# Patient Record
Sex: FEMALE | Race: WHITE | Employment: FULL TIME | URBAN - METROPOLITAN AREA
[De-identification: names, ages, dates, MRNs, and addresses within clinical notes are randomized per-mention and may not be internally consistent; named-entity substitution may affect disease eponyms.]

---

## 2017-03-20 ENCOUNTER — TRANSFERRED RECORDS (OUTPATIENT)
Dept: HEALTH INFORMATION MANAGEMENT | Facility: CLINIC | Age: 37
End: 2017-03-20

## 2017-04-03 ENCOUNTER — TRANSFERRED RECORDS (OUTPATIENT)
Dept: HEALTH INFORMATION MANAGEMENT | Facility: CLINIC | Age: 37
End: 2017-04-03

## 2018-09-13 ENCOUNTER — TRANSFERRED RECORDS (OUTPATIENT)
Dept: HEALTH INFORMATION MANAGEMENT | Facility: CLINIC | Age: 38
End: 2018-09-13

## 2018-10-30 ENCOUNTER — TRANSFERRED RECORDS (OUTPATIENT)
Dept: HEALTH INFORMATION MANAGEMENT | Facility: CLINIC | Age: 38
End: 2018-10-30

## 2019-05-01 ENCOUNTER — TRANSFERRED RECORDS (OUTPATIENT)
Dept: HEALTH INFORMATION MANAGEMENT | Facility: CLINIC | Age: 39
End: 2019-05-01

## 2019-11-11 ENCOUNTER — TRANSFERRED RECORDS (OUTPATIENT)
Dept: HEALTH INFORMATION MANAGEMENT | Facility: CLINIC | Age: 39
End: 2019-11-11

## 2019-11-12 ENCOUNTER — TRANSFERRED RECORDS (OUTPATIENT)
Dept: HEALTH INFORMATION MANAGEMENT | Facility: CLINIC | Age: 39
End: 2019-11-12

## 2019-11-15 NOTE — TELEPHONE ENCOUNTER
ONCOLOGY INTAKE: Records Information      APPT INFORMATION:  Referring provider:  Dr. Mayito Mcgraw  Referring provider s clinic:  [facility]  Reason for visit/diagnosis:  Complex Ovarian Cyst  Has patient been notified of appointment date and time?: yes    RECORDS INFORMATION:  Were the records received with the referral (via [employer])? Not yet, patient states they are on their way    Has patient been seen for any external appt for this diagnosis? yes    If yes, where? [facility]    Has patient had any imaging or procedures outside of [place] for this condition? yes      If Yes, where? [facility]    ADDITIONAL INFORMATION:  Will fax records and referral once received

## 2019-11-18 NOTE — TELEPHONE ENCOUNTER
RECORDS STATUS - ALL OTHER DIAGNOSIS      RECORDS RECEIVED FROM: [facility]   DATE RECEIVED: 11/19/2019   NOTES STATUS DETAILS   OFFICE NOTE from referring provider Complete  EPIC   OFFICE NOTE from medical oncologist     DISCHARGE SUMMARY from hospital     DISCHARGE REPORT from the ER     OPERATIVE REPORT     MEDICATION LIST     CLINICAL TRIAL TREATMENTS TO DATE     LABS     PATHOLOGY REPORTS     ANYTHING RELATED TO DIAGNOSIS     GENONOMIC TESTING     TYPE:     IMAGING (NEED IMAGES & REPORT)     CT SCANS Complete  [facility] IMG in PACS   MRI     MAMMO     ULTRASOUND Complete  [facility] IMG in PACS   PET       Action    Action Taken 11/18/2019 9:34am     I called pt Jessika - She mentioned that her records are at [facility] (Including her CA- 125 Test). Dr. Mayito Mcgraw is her primary.  She also mentioned having a CT and Pelvic US done. I called [facility] and was recommend to call [facility] (Ph: [phone])  to get her IMG and reports sent over. I also left a vm for the medical records dept at [facility].     11:33am   I resolved IMG ([facility]) in PACS.     12pm Records ([facility])  faxed to HIM     1:59pm   I sent an email to HIM (Kendra) to make sure the outside records are uploaded into the pt's chart today.

## 2019-11-19 ENCOUNTER — ONCOLOGY VISIT (OUTPATIENT)
Dept: ONCOLOGY | Facility: CLINIC | Age: 39
End: 2019-11-19
Attending: OBSTETRICS & GYNECOLOGY
Payer: COMMERCIAL

## 2019-11-19 ENCOUNTER — DOCUMENTATION ONLY (OUTPATIENT)
Dept: ONCOLOGY | Facility: CLINIC | Age: 39
End: 2019-11-19

## 2019-11-19 ENCOUNTER — HOSPITAL ENCOUNTER (OUTPATIENT)
Facility: CLINIC | Age: 39
Setting detail: SPECIMEN
Discharge: HOME | End: 2019-11-19
Attending: OBSTETRICS & GYNECOLOGY | Admitting: OBSTETRICS & GYNECOLOGY
Payer: COMMERCIAL

## 2019-11-19 ENCOUNTER — PRE VISIT (OUTPATIENT)
Dept: ONCOLOGY | Facility: CLINIC | Age: 39
End: 2019-11-19

## 2019-11-19 VITALS
WEIGHT: 208 LBS | OXYGEN SATURATION: 97 % | HEIGHT: 63 IN | RESPIRATION RATE: 16 BRPM | TEMPERATURE: 99.1 F | SYSTOLIC BLOOD PRESSURE: 159 MMHG | DIASTOLIC BLOOD PRESSURE: 100 MMHG | BODY MASS INDEX: 36.86 KG/M2 | HEART RATE: 118 BPM

## 2019-11-19 DIAGNOSIS — R19.00 PELVIC MASS: Primary | ICD-10-CM

## 2019-11-19 LAB
ABO + RH BLD: NORMAL
ABO + RH BLD: NORMAL
ALBUMIN SERPL-MCNC: 3.8 G/DL (ref 3.4–5)
ALP SERPL-CCNC: 64 U/L (ref 40–150)
ALT SERPL W P-5'-P-CCNC: 19 U/L (ref 0–50)
ANION GAP SERPL CALCULATED.3IONS-SCNC: 5 MMOL/L (ref 3–14)
AST SERPL W P-5'-P-CCNC: 20 U/L (ref 0–45)
BASOPHILS # BLD AUTO: 0.1 10E9/L (ref 0–0.2)
BASOPHILS NFR BLD AUTO: 0.6 %
BILIRUB SERPL-MCNC: 0.4 MG/DL (ref 0.2–1.3)
BLD GP AB SCN SERPL QL: NORMAL
BLOOD BANK CMNT PATIENT-IMP: NORMAL
BUN SERPL-MCNC: 6 MG/DL (ref 7–30)
CALCIUM SERPL-MCNC: 9.1 MG/DL (ref 8.5–10.1)
CHLORIDE SERPL-SCNC: 110 MMOL/L (ref 94–109)
CO2 SERPL-SCNC: 24 MMOL/L (ref 20–32)
CREAT SERPL-MCNC: 0.72 MG/DL (ref 0.52–1.04)
DIFFERENTIAL METHOD BLD: NORMAL
EOSINOPHIL # BLD AUTO: 0 10E9/L (ref 0–0.7)
EOSINOPHIL NFR BLD AUTO: 0.5 %
ERYTHROCYTE [DISTWIDTH] IN BLOOD BY AUTOMATED COUNT: 11.9 % (ref 10–15)
GFR SERPL CREATININE-BSD FRML MDRD: >90 ML/MIN/{1.73_M2}
GLUCOSE SERPL-MCNC: 105 MG/DL (ref 70–99)
HCT VFR BLD AUTO: 41.8 % (ref 35–47)
HGB BLD-MCNC: 13.7 G/DL (ref 11.7–15.7)
IMM GRANULOCYTES # BLD: 0 10E9/L (ref 0–0.4)
IMM GRANULOCYTES NFR BLD: 0.4 %
LYMPHOCYTES # BLD AUTO: 1.4 10E9/L (ref 0.8–5.3)
LYMPHOCYTES NFR BLD AUTO: 17.6 %
MCH RBC QN AUTO: 29.9 PG (ref 26.5–33)
MCHC RBC AUTO-ENTMCNC: 32.8 G/DL (ref 31.5–36.5)
MCV RBC AUTO: 91 FL (ref 78–100)
MONOCYTES # BLD AUTO: 0.4 10E9/L (ref 0–1.3)
MONOCYTES NFR BLD AUTO: 5.2 %
NEUTROPHILS # BLD AUTO: 6.1 10E9/L (ref 1.6–8.3)
NEUTROPHILS NFR BLD AUTO: 75.7 %
NRBC # BLD AUTO: 0 10*3/UL
NRBC BLD AUTO-RTO: 0 /100
PLATELET # BLD AUTO: 300 10E9/L (ref 150–450)
POTASSIUM SERPL-SCNC: 3.9 MMOL/L (ref 3.4–5.3)
PROT SERPL-MCNC: 7.4 G/DL (ref 6.8–8.8)
RBC # BLD AUTO: 4.58 10E12/L (ref 3.8–5.2)
SODIUM SERPL-SCNC: 139 MMOL/L (ref 133–144)
SPECIMEN EXP DATE BLD: NORMAL
WBC # BLD AUTO: 8.1 10E9/L (ref 4–11)

## 2019-11-19 PROCEDURE — 36415 COLL VENOUS BLD VENIPUNCTURE: CPT

## 2019-11-19 PROCEDURE — G0463 HOSPITAL OUTPT CLINIC VISIT: HCPCS

## 2019-11-19 PROCEDURE — 99205 OFFICE O/P NEW HI 60 MIN: CPT | Performed by: OBSTETRICS & GYNECOLOGY

## 2019-11-19 PROCEDURE — 85025 COMPLETE CBC W/AUTO DIFF WBC: CPT | Performed by: OBSTETRICS & GYNECOLOGY

## 2019-11-19 PROCEDURE — 80053 COMPREHEN METABOLIC PANEL: CPT | Performed by: OBSTETRICS & GYNECOLOGY

## 2019-11-19 PROCEDURE — 86850 RBC ANTIBODY SCREEN: CPT | Performed by: OBSTETRICS & GYNECOLOGY

## 2019-11-19 PROCEDURE — 86901 BLOOD TYPING SEROLOGIC RH(D): CPT | Performed by: OBSTETRICS & GYNECOLOGY

## 2019-11-19 PROCEDURE — 86900 BLOOD TYPING SEROLOGIC ABO: CPT | Performed by: OBSTETRICS & GYNECOLOGY

## 2019-11-19 RX ORDER — LORAZEPAM 0.5 MG/1
0.5 TABLET ORAL EVERY 6 HOURS PRN
COMMUNITY

## 2019-11-19 RX ORDER — PHENAZOPYRIDINE HYDROCHLORIDE 100 MG/1
200 TABLET, FILM COATED ORAL ONCE
Status: CANCELLED | OUTPATIENT
Start: 2019-11-19 | End: 2019-11-19

## 2019-11-19 RX ORDER — HEPARIN SODIUM 10000 [USP'U]/ML
5000 INJECTION, SOLUTION INTRAVENOUS; SUBCUTANEOUS
Status: CANCELLED | OUTPATIENT
Start: 2019-11-19

## 2019-11-19 ASSESSMENT — PAIN SCALES - GENERAL: PAINLEVEL: NO PAIN (0)

## 2019-11-19 ASSESSMENT — MIFFLIN-ST. JEOR: SCORE: 1586.22

## 2019-11-19 NOTE — NURSING NOTE
"Oncology Rooming Note    November 19, 2019 9:59 AM   Jessika Marks is a 39 year old female who presents for:    Chief Complaint   Patient presents with     Oncology Clinic Visit     New Patient consult     Initial Vitals: BP (!) 159/100   Pulse 118   Temp 99.1  F (37.3  C) (Tympanic)   Resp 16   Ht 1.59 m (5' 2.6\")   Wt 94.3 kg (208 lb)   SpO2 97%   BMI 37.32 kg/m   Estimated body mass index is 37.32 kg/m  as calculated from the following:    Height as of this encounter: 1.59 m (5' 2.6\").    Weight as of this encounter: 94.3 kg (208 lb). Body surface area is 2.04 meters squared.  No Pain (0) Comment: Data Unavailable   No LMP recorded.  Allergies reviewed: Yes  Medications reviewed: Yes    Medications: Medication refills not needed today.  Pharmacy name entered into EPIC: Data Unavailable    Clinical concerns: New Patient       Fatou Davis, ZANE              "

## 2019-11-19 NOTE — PROGRESS NOTES
I scheduled surgery for this patient with Dr. Pereyra on 11/27 at [place]. Scheduled per MD orders.    The RN has completed the education regarding the surgery, and they were provided a surgery packet with instructions and a map.     They are aware that they will receive a call  ~2 days prior to the scheduled procedure and will be given an exact arrival/start time.    Per communication - I did not need to call the patient to provide any extra additional information. I will follow up if anything changes.

## 2019-11-19 NOTE — PATIENT INSTRUCTIONS
You have been scheduled for surgery on: 11/27    Diagnosis:  Pelvic mass    The surgical procedure is: Laparoscopic removal of one ovary, possible cancer surgery, possible open    Anticipated length of surgery: 1-2 hrs.  You will be in the recovery room for approximately 2-3 hrs after surgery.  Your family will not be able to see you until after you leave the recovery room.    Length of hospital stay:  This is an outpatient, extended stay surgery.  You will be discharged same day if you meet criteria for discharge.  If you have a larger surgical incision, you will need to be in the hospital 2-3 days.  ______________________________________________________________________    Preparation for Surgery:    To Schedule   1.  Labs:  CBC, CMP, T/S, orders placed, please perform today   2.  Post-operative exam with me 1-2 weeks following surgery      Postoperative Restrictions:  No heavy lifting >20lbs for six weeks.     Postoperative visit:  Return to clinic 1-2 weeks after surgery for post operative visit.      Please contact the clinic with any questions or concerns.    Chanel Pereyra MD  Gynecologic Oncology  [facility]

## 2019-11-19 NOTE — PROGRESS NOTES
Consult Notes on Referred Patient              RE: Jessika Marks  : 1980  MARTIN: 2019        I had the pleasure of seeing your patient Jessika Marks here at the Gynecologic Cancer Clinic at the [facility] on 2019.  As you know she is a very pleasant 39 year old woman with a recent diagnosis of pelvic mass and elevated .  Given these findings she was subsequently sent to the Gynecologic Cancer Clinic for new patient consultation.  She is accompanied today by her mother and good friend.    Patient developed fever and RLQ pain associated with diarrhea and presented to the ED.  She underwent a CT as there was concern for appendicitis.  Her appendix was noted to be normal but she was found to have a left adnexal mass.  Her fever and diarrhea have since resolved and her son had a similar gastroenteritis like illness a few days following this episode.  She is now asymptomatic.  Of note she had a hysterectomy, BS and was also found to have endometriosis that was fulgurated at the time of that procedure.    19:  CT A/P:  Multiloculated cystic mass in the left adnexa, likely arising from the left ovary measuring 8.7 cm.  Mild inflammatory changes around this mass.  No areas of metastatic disease identified.    19:  US Pelvis:  Complex cystic mass in the left ovary measuring 9.3 cm with a thickened wall and thick septations    19:  OVA1 = 9.6 = high risk    = 338      Review of Systems:  Systemic           no weight changes; no fever; no chills; no night sweats; no appetite changes  Skin           no rashes, or lesions  Eye           no irritation; no changes in vision  Jean-Laryngeal           no dysphagia; no hoarseness   Pulmonary    no cough; no shortness of breath  Cardiovascular    no chest pain; no palpitations  Gastrointestinal    no diarrhea; no constipation; no abdominal pain; no changes in bowel  habits; no blood in stool  Genitourinary   no urinary  frequency; no urinary urgency; no dysuria; no pain; no abnormal vaginal discharge; no abnormal vaginal bleeding  Breast    no breast discharge; no breast changes; no breast pain  Musculoskeletal    no myalgias; no arthralgias; no back pain  Psychiatric           no depressed mood; no anxiety    Hematologic            no tender lymph nodes; no noticeable swellings or lumps   Endocrine    no hot flashes; no heat/cold intolerance         Neurological   no tremor; no numbness and tingling; no headaches; no difficulty sleeping    Obstetrics and Gynecology History:  ,  x 1      Past Medical History:  History reviewed. No pertinent past medical history.    Past Surgical History:  Past Surgical History:   Procedure Laterality Date     C VAGINAL HYSTERECTOMY       CARPAL TUNNEL RELEASE RT/LT Right      LAPAROSCOPIC CHOLECYSTECTOMY         Health Maintenance:  Last Pap Smear: Prior to hysterectomy-no need for further pap smear exams  She has not had a history of abnormal Pap smears.    Last Mammogram: N/A    Last Colonoscopy: N/A      Current Medications:   has a current medication list which includes the following prescription(s): lorazepam.     Allergies:   Patient has no known allergies.      Social History:  Social History     Socioeconomic History     Marital status:      Spouse name: Not on file     Number of children: Not on file     Years of education: Not on file     Highest education level: Not on file   Occupational History     Not on file   Social Needs     Financial resource strain: Not on file     Food insecurity:     Worry: Not on file     Inability: Not on file     Transportation needs:     Medical: Not on file     Non-medical: Not on file   Tobacco Use     Smoking status: Current Some Day Smoker     Smokeless tobacco: Never Used   Substance and Sexual Activity     Alcohol use: Yes     Drug use: Never     Sexual activity: Yes   Lifestyle     Physical activity:     Days per week: Not on file  "    Minutes per session: Not on file     Stress: Not on file   Relationships     Social connections:     Talks on phone: Not on file     Gets together: Not on file     Attends Rastafarian service: Not on file     Active member of club or organization: Not on file     Attends meetings of clubs or organizations: Not on file     Relationship status: Not on file     Intimate partner violence:     Fear of current or ex partner: Not on file     Emotionally abused: Not on file     Physically abused: Not on file     Forced sexual activity: Not on file   Other Topics Concern     Not on file   Social History Narrative     Not on file       Lives with , feels safe at home.  Works as a .  Enjoys riding horses, reading, watching son play hockey.  Does not have an advanced directive on file and would like her  to be her POA.  Full code.    Family History:   The patient's family history is significant for.  Family History   Problem Relation Age of Onset     Lymphoma Father      Breast Cancer Paternal Grandmother          Physical Exam:   BP (!) 159/100   Pulse 118   Temp 99.1  F (37.3  C) (Tympanic)   Resp 16   Ht 1.59 m (5' 2.6\")   Wt 94.3 kg (208 lb)   SpO2 97%   BMI 37.32 kg/m    Body mass index is 37.32 kg/m .    General Appearance: healthy and alert, no distress     HEENT:  no thyromegaly, no palpable nodules or masses        Cardiovascular: regular rate and rhythm, no gallops, rubs or murmurs     Respiratory: lungs clear, no rales, rhonchi or wheezes, normal diaphragmatic excursion    Musculoskeletal: extremities non tender and without edema    Skin: no lesions or rashes     Neurological: normal gait, no gross defects     Psychiatric: appropriate mood and affect                               Hematological: normal cervical, supraclavicular and inguinal lymph nodes     Gastrointestinal:       abdomen soft, non-tender, non-distended, no organomegaly or masses    Genitourinary: External genitalia " and urethral meatus appears normal.  Vagina is smooth without nodularity or masses.  Cervix surgically absent.  Bimanual exam reveal no masses, nodularity or fullness.  Recto-vaginal exam confirms these findings.      Assessment:    Jessika Marks is a 39 year old woman with a new diagnosis of pelvic mass, elevated .     A total of 60 minutes was spent with the patient, >50% of which were spent in counseling the patient and/or treatment planning.      Plan:     1.)    Pelvic mass, elevated .  We discussed possible etiologies including benign, malignant and borderline.  We reviewed her CT without evidence of metastatic disease and discussed that this is reassuring.  We discussed her elevated  and that this does make malignancy more likely this is also possibly due to her recent gastroenteritis or endometriosis.  We discussed the rationale for not performing a biopsy of the ovary.  We discussed options including repeat imaging vs immediate surgical resection and she would like to proceed with immediate surgical resection.  She asked about bilateral oophorectomy, however I have advised that if this is not malignant that she retain her other ovary even in the setting of borderline tumor unless her contralateral ovary is also grossly abnormal.  We reviewed the role of frozen section analysis and discussed that further surgical decisions would be based on these findings.  Following a thorough discussion of the risks, benefits, indications and alternatives she signed consents for Laparoscopic removal of one ovary, possible cancer surgery, possible open on 11/27.     2.) Genetic risk factors were assessed and the patient does not meet the qualifications for a referral unless malignancy is identified.      3.) Labs and/or tests ordered include:  CBC, CMP, T/S.     4.) Health maintenance issues addressed today include pt is up to date.    5.) Pre-op teaching was completed today.        Thank you for allowing  us to participate in the care of your patient.         Sincerely,    Chanel Pereyra MD  Gynecologic Oncology  [facility]       CC

## 2019-11-19 NOTE — LETTER
2019         RE: Jessika Marks  [address] [place] [address]        Dear Colleague,    Thank you for referring your patient, Jessika Marks, to the [facility]. Please see a copy of my visit note below.    Consult Notes on Referred Patient              RE: Jessika Marks  : 1980  MARTIN: 2019        I had the pleasure of seeing your patient Jessika Marks here at the Gynecologic Cancer Clinic at the [facility] on 2019.  As you know she is a very pleasant 39 year old woman with a recent diagnosis of pelvic mass and elevated .  Given these findings she was subsequently sent to the Gynecologic Cancer Clinic for new patient consultation.  She is accompanied today by her mother and good friend.    Patient developed fever and RLQ pain associated with diarrhea and presented to the ED.  She underwent a CT as there was concern for appendicitis.  Her appendix was noted to be normal but she was found to have a left adnexal mass.  Her fever and diarrhea have since resolved and her son had a similar gastroenteritis like illness a few days following this episode.  She is now asymptomatic.  Of note she had a hysterectomy, BS and was also found to have endometriosis that was fulgurated at the time of that procedure.    19:  CT A/P:  Multiloculated cystic mass in the left adnexa, likely arising from the left ovary measuring 8.7 cm.  Mild inflammatory changes around this mass.  No areas of metastatic disease identified.    19:  US Pelvis:  Complex cystic mass in the left ovary measuring 9.3 cm with a thickened wall and thick septations    19:  OVA1 = 9.6 = high risk    = 338      Review of Systems:  Systemic           no weight changes; no fever; no chills; no night sweats; no appetite changes  Skin           no rashes, or lesions  Eye           no irritation; no changes in vision  Jean-Laryngeal           no dysphagia; no hoarseness   Pulmonary     no cough; no shortness of breath  Cardiovascular    no chest pain; no palpitations  Gastrointestinal    no diarrhea; no constipation; no abdominal pain; no changes in bowel  habits; no blood in stool  Genitourinary   no urinary frequency; no urinary urgency; no dysuria; no pain; no abnormal vaginal discharge; no abnormal vaginal bleeding  Breast    no breast discharge; no breast changes; no breast pain  Musculoskeletal    no myalgias; no arthralgias; no back pain  Psychiatric           no depressed mood; no anxiety    Hematologic            no tender lymph nodes; no noticeable swellings or lumps   Endocrine    no hot flashes; no heat/cold intolerance         Neurological   no tremor; no numbness and tingling; no headaches; no difficulty sleeping    Obstetrics and Gynecology History:  ,  x 1      Past Medical History:  History reviewed. No pertinent past medical history.    Past Surgical History:  Past Surgical History:   Procedure Laterality Date     C VAGINAL HYSTERECTOMY       CARPAL TUNNEL RELEASE RT/LT Right      LAPAROSCOPIC CHOLECYSTECTOMY         Health Maintenance:  Last Pap Smear: Prior to hysterectomy-no need for further pap smear exams  She has not had a history of abnormal Pap smears.    Last Mammogram: N/A    Last Colonoscopy: N/A      Current Medications:   has a current medication list which includes the following prescription(s): lorazepam.     Allergies:   Patient has no known allergies.      Social History:  Social History     Socioeconomic History     Marital status:      Spouse name: Not on file     Number of children: Not on file     Years of education: Not on file     Highest education level: Not on file   Occupational History     Not on file   Social Needs     Financial resource strain: Not on file     Food insecurity:     Worry: Not on file     Inability: Not on file     Transportation needs:     Medical: Not on file     Non-medical: Not on file   Tobacco Use     Smoking  "status: Current Some Day Smoker     Smokeless tobacco: Never Used   Substance and Sexual Activity     Alcohol use: Yes     Drug use: Never     Sexual activity: Yes   Lifestyle     Physical activity:     Days per week: Not on file     Minutes per session: Not on file     Stress: Not on file   Relationships     Social connections:     Talks on phone: Not on file     Gets together: Not on file     Attends Mosque service: Not on file     Active member of club or organization: Not on file     Attends meetings of clubs or organizations: Not on file     Relationship status: Not on file     Intimate partner violence:     Fear of current or ex partner: Not on file     Emotionally abused: Not on file     Physically abused: Not on file     Forced sexual activity: Not on file   Other Topics Concern     Not on file   Social History Narrative     Not on file       Lives with , feels safe at home.  Works as a .  Enjoys riding horses, reading, watching son play hockey.  Does not have an advanced directive on file and would like her  to be her POA.  Full code.    Family History:   The patient's family history is significant for.  Family History   Problem Relation Age of Onset     Lymphoma Father      Breast Cancer Paternal Grandmother          Physical Exam:   BP (!) 159/100   Pulse 118   Temp 99.1  F (37.3  C) (Tympanic)   Resp 16   Ht 1.59 m (5' 2.6\")   Wt 94.3 kg (208 lb)   SpO2 97%   BMI 37.32 kg/m     Body mass index is 37.32 kg/m .    General Appearance: healthy and alert, no distress     HEENT:  no thyromegaly, no palpable nodules or masses        Cardiovascular: regular rate and rhythm, no gallops, rubs or murmurs     Respiratory: lungs clear, no rales, rhonchi or wheezes, normal diaphragmatic excursion    Musculoskeletal: extremities non tender and without edema    Skin: no lesions or rashes     Neurological: normal gait, no gross defects     Psychiatric: appropriate mood and affect        "                        Hematological: normal cervical, supraclavicular and inguinal lymph nodes     Gastrointestinal:       abdomen soft, non-tender, non-distended, no organomegaly or masses    Genitourinary: External genitalia and urethral meatus appears normal.  Vagina is smooth without nodularity or masses.  Cervix surgically absent.  Bimanual exam reveal no masses, nodularity or fullness.  Recto-vaginal exam confirms these findings.      Assessment:    Jessika Marks is a 39 year old woman with a new diagnosis of pelvic mass, elevated .     A total of 60 minutes was spent with the patient, >50% of which were spent in counseling the patient and/or treatment planning.      Plan:     1.)    Pelvic mass, elevated .  We discussed possible etiologies including benign, malignant and borderline.  We reviewed her CT without evidence of metastatic disease and discussed that this is reassuring.  We discussed her elevated  and that this does make malignancy more likely this is also possibly due to her recent gastroenteritis or endometriosis.  We discussed the rationale for not performing a biopsy of the ovary.  We discussed options including repeat imaging vs immediate surgical resection and she would like to proceed with immediate surgical resection.  She asked about bilateral oophorectomy, however I have advised that if this is not malignant that she retain her other ovary even in the setting of borderline tumor unless her contralateral ovary is also grossly abnormal.  We reviewed the role of frozen section analysis and discussed that further surgical decisions would be based on these findings.  Following a thorough discussion of the risks, benefits, indications and alternatives she signed consents for Laparoscopic removal of one ovary, possible cancer surgery, possible open on 11/27.     2.) Genetic risk factors were assessed and the patient does not meet the qualifications for a referral unless  malignancy is identified.      3.) Labs and/or tests ordered include:  CBC, CMP, T/S.     4.) Health maintenance issues addressed today include pt is up to date.    5.) Pre-op teaching was completed today.        Thank you for allowing us to participate in the care of your patient.         Sincerely,    Chanel Pereyra MD  Gynecologic Oncology  [facility]       CC           Again, thank you for allowing me to participate in the care of your patient.        Sincerely,        Dion Pereyra MD

## 2019-11-22 ENCOUNTER — PRE VISIT (OUTPATIENT)
Dept: ONCOLOGY | Facility: CLINIC | Age: 39
End: 2019-11-22

## 2019-11-22 NOTE — NURSING NOTE
Met with pt on 11/19/19 for surgical education.  Pt scheduled for Laparoscopic hysterectomy, bso, possible cancer surgery on 11/27/19 with Dr Pereyra. See pt education for detailed note.  Marylin Velasco RN, BSN, OCN

## 2019-11-25 ENCOUNTER — ANESTHESIA EVENT (OUTPATIENT)
Dept: SURGERY | Facility: CLINIC | Age: 39
End: 2019-11-25
Payer: COMMERCIAL

## 2019-11-27 ENCOUNTER — SURGERY (OUTPATIENT)
Age: 39
End: 2019-11-27
Payer: COMMERCIAL

## 2019-11-27 ENCOUNTER — HOSPITAL ENCOUNTER (OUTPATIENT)
Facility: CLINIC | Age: 39
Discharge: HOME | End: 2019-11-27
Attending: OBSTETRICS & GYNECOLOGY | Admitting: OBSTETRICS & GYNECOLOGY
Payer: COMMERCIAL

## 2019-11-27 ENCOUNTER — ANESTHESIA (OUTPATIENT)
Dept: SURGERY | Facility: CLINIC | Age: 39
End: 2019-11-27
Payer: COMMERCIAL

## 2019-11-27 VITALS
HEART RATE: 60 BPM | RESPIRATION RATE: 16 BRPM | BODY MASS INDEX: 38.3 KG/M2 | DIASTOLIC BLOOD PRESSURE: 98 MMHG | TEMPERATURE: 96.9 F | OXYGEN SATURATION: 100 % | WEIGHT: 208.11 LBS | HEIGHT: 62 IN | SYSTOLIC BLOOD PRESSURE: 169 MMHG

## 2019-11-27 DIAGNOSIS — Z98.890 S/P LAPAROSCOPY: Primary | ICD-10-CM

## 2019-11-27 DIAGNOSIS — R19.00 PELVIC MASS: ICD-10-CM

## 2019-11-27 LAB
ABO + RH BLD: NORMAL
ABO + RH BLD: NORMAL
BLD GP AB SCN SERPL QL: NORMAL
BLOOD BANK CMNT PATIENT-IMP: NORMAL
GLUCOSE BLDC GLUCOMTR-MCNC: 83 MG/DL (ref 70–99)
SPECIMEN EXP DATE BLD: NORMAL

## 2019-11-27 PROCEDURE — 25000125 ZZHC RX 250: Performed by: NURSE ANESTHETIST, CERTIFIED REGISTERED

## 2019-11-27 PROCEDURE — 71000014 ZZH RECOVERY PHASE 1 LEVEL 2 FIRST HR: Performed by: OBSTETRICS & GYNECOLOGY

## 2019-11-27 PROCEDURE — 36000059 ZZH SURGERY LEVEL 3 EA 15 ADDTL MIN UMMC: Performed by: OBSTETRICS & GYNECOLOGY

## 2019-11-27 PROCEDURE — 86850 RBC ANTIBODY SCREEN: CPT | Performed by: ANESTHESIOLOGY

## 2019-11-27 PROCEDURE — 71000015 ZZH RECOVERY PHASE 1 LEVEL 2 EA ADDTL HR: Performed by: OBSTETRICS & GYNECOLOGY

## 2019-11-27 PROCEDURE — 37000009 ZZH ANESTHESIA TECHNICAL FEE, EACH ADDTL 15 MIN: Performed by: OBSTETRICS & GYNECOLOGY

## 2019-11-27 PROCEDURE — 37000008 ZZH ANESTHESIA TECHNICAL FEE, 1ST 30 MIN: Performed by: OBSTETRICS & GYNECOLOGY

## 2019-11-27 PROCEDURE — 88331 PATH CONSLTJ SURG 1 BLK 1SPC: CPT | Performed by: OBSTETRICS & GYNECOLOGY

## 2019-11-27 PROCEDURE — 58661 LAPAROSCOPY REMOVE ADNEXA: CPT | Mod: GC | Performed by: OBSTETRICS & GYNECOLOGY

## 2019-11-27 PROCEDURE — 25000125 ZZHC RX 250: Performed by: ANESTHESIOLOGY

## 2019-11-27 PROCEDURE — 00000155 ZZHCL STATISTIC H-CELL BLOCK W/STAIN: Performed by: OBSTETRICS & GYNECOLOGY

## 2019-11-27 PROCEDURE — 25000132 ZZH RX MED GY IP 250 OP 250 PS 637: Performed by: OBSTETRICS & GYNECOLOGY

## 2019-11-27 PROCEDURE — 86901 BLOOD TYPING SEROLOGIC RH(D): CPT | Performed by: ANESTHESIOLOGY

## 2019-11-27 PROCEDURE — 71000027 ZZH RECOVERY PHASE 2 EACH 15 MINS: Performed by: OBSTETRICS & GYNECOLOGY

## 2019-11-27 PROCEDURE — 88307 TISSUE EXAM BY PATHOLOGIST: CPT | Performed by: OBSTETRICS & GYNECOLOGY

## 2019-11-27 PROCEDURE — 86900 BLOOD TYPING SEROLOGIC ABO: CPT | Performed by: ANESTHESIOLOGY

## 2019-11-27 PROCEDURE — 25000128 H RX IP 250 OP 636: Performed by: NURSE ANESTHETIST, CERTIFIED REGISTERED

## 2019-11-27 PROCEDURE — 00000146 ZZHCL STATISTIC GLUCOSE BY METER IP

## 2019-11-27 PROCEDURE — 88112 CYTOPATH CELL ENHANCE TECH: CPT | Performed by: OBSTETRICS & GYNECOLOGY

## 2019-11-27 PROCEDURE — 36415 COLL VENOUS BLD VENIPUNCTURE: CPT | Performed by: ANESTHESIOLOGY

## 2019-11-27 PROCEDURE — 40000170 ZZH STATISTIC PRE-PROCEDURE ASSESSMENT II: Performed by: OBSTETRICS & GYNECOLOGY

## 2019-11-27 PROCEDURE — 25800030 ZZH RX IP 258 OP 636: Performed by: ANESTHESIOLOGY

## 2019-11-27 PROCEDURE — 25000128 H RX IP 250 OP 636: Performed by: OBSTETRICS & GYNECOLOGY

## 2019-11-27 PROCEDURE — 25000566 ZZH SEVOFLURANE, EA 15 MIN: Performed by: OBSTETRICS & GYNECOLOGY

## 2019-11-27 PROCEDURE — 27210794 ZZH OR GENERAL SUPPLY STERILE: Performed by: OBSTETRICS & GYNECOLOGY

## 2019-11-27 PROCEDURE — 36000057 ZZH SURGERY LEVEL 3 1ST 30 MIN - UMMC: Performed by: OBSTETRICS & GYNECOLOGY

## 2019-11-27 PROCEDURE — 25000132 ZZH RX MED GY IP 250 OP 250 PS 637: Performed by: ANESTHESIOLOGY

## 2019-11-27 PROCEDURE — 88305 TISSUE EXAM BY PATHOLOGIST: CPT | Performed by: OBSTETRICS & GYNECOLOGY

## 2019-11-27 PROCEDURE — 25000128 H RX IP 250 OP 636: Performed by: ANESTHESIOLOGY

## 2019-11-27 RX ORDER — HYDROMORPHONE HYDROCHLORIDE 1 MG/ML
.3-.5 INJECTION, SOLUTION INTRAMUSCULAR; INTRAVENOUS; SUBCUTANEOUS EVERY 10 MIN PRN
Status: DISCONTINUED | OUTPATIENT
Start: 2019-11-27 | End: 2019-11-27 | Stop reason: HOSPADM

## 2019-11-27 RX ORDER — ACETAMINOPHEN 325 MG/1
650 TABLET ORAL
Status: DISCONTINUED | OUTPATIENT
Start: 2019-11-27 | End: 2019-11-27 | Stop reason: HOSPADM

## 2019-11-27 RX ORDER — OXYCODONE HYDROCHLORIDE 5 MG/1
5 TABLET ORAL
Status: DISCONTINUED | OUTPATIENT
Start: 2019-11-27 | End: 2019-11-27 | Stop reason: HOSPADM

## 2019-11-27 RX ORDER — PROPOFOL 10 MG/ML
INJECTION, EMULSION INTRAVENOUS PRN
Status: DISCONTINUED | OUTPATIENT
Start: 2019-11-27 | End: 2019-11-27

## 2019-11-27 RX ORDER — NALOXONE HYDROCHLORIDE 0.4 MG/ML
.1-.4 INJECTION, SOLUTION INTRAMUSCULAR; INTRAVENOUS; SUBCUTANEOUS
Status: DISCONTINUED | OUTPATIENT
Start: 2019-11-27 | End: 2019-11-27 | Stop reason: HOSPADM

## 2019-11-27 RX ORDER — LIDOCAINE 40 MG/G
CREAM TOPICAL
Status: DISCONTINUED | OUTPATIENT
Start: 2019-11-27 | End: 2019-11-27 | Stop reason: HOSPADM

## 2019-11-27 RX ORDER — FENTANYL CITRATE 50 UG/ML
INJECTION, SOLUTION INTRAMUSCULAR; INTRAVENOUS PRN
Status: DISCONTINUED | OUTPATIENT
Start: 2019-11-27 | End: 2019-11-27

## 2019-11-27 RX ORDER — AMOXICILLIN 250 MG
1-2 CAPSULE ORAL 2 TIMES DAILY
Qty: 30 TABLET | Refills: 0 | Status: SHIPPED | OUTPATIENT
Start: 2019-11-27 | End: 2020-01-01

## 2019-11-27 RX ORDER — LIDOCAINE HYDROCHLORIDE 20 MG/ML
INJECTION, SOLUTION INFILTRATION; PERINEURAL PRN
Status: DISCONTINUED | OUTPATIENT
Start: 2019-11-27 | End: 2019-11-27

## 2019-11-27 RX ORDER — IBUPROFEN 600 MG/1
600 TABLET, FILM COATED ORAL EVERY 6 HOURS
Qty: 12 TABLET | Refills: 0 | Status: SHIPPED | OUTPATIENT
Start: 2019-11-27

## 2019-11-27 RX ORDER — SODIUM CHLORIDE, SODIUM LACTATE, POTASSIUM CHLORIDE, CALCIUM CHLORIDE 600; 310; 30; 20 MG/100ML; MG/100ML; MG/100ML; MG/100ML
INJECTION, SOLUTION INTRAVENOUS CONTINUOUS
Status: DISCONTINUED | OUTPATIENT
Start: 2019-11-27 | End: 2019-11-27 | Stop reason: HOSPADM

## 2019-11-27 RX ORDER — DEXAMETHASONE SODIUM PHOSPHATE 4 MG/ML
INJECTION, SOLUTION INTRA-ARTICULAR; INTRALESIONAL; INTRAMUSCULAR; INTRAVENOUS; SOFT TISSUE PRN
Status: DISCONTINUED | OUTPATIENT
Start: 2019-11-27 | End: 2019-11-27

## 2019-11-27 RX ORDER — OXYCODONE HYDROCHLORIDE 5 MG/1
5 TABLET ORAL EVERY 6 HOURS PRN
Qty: 6 TABLET | Refills: 0 | Status: SHIPPED | OUTPATIENT
Start: 2019-11-27 | End: 2020-01-01

## 2019-11-27 RX ORDER — ONDANSETRON 4 MG/1
4-8 TABLET, ORALLY DISINTEGRATING ORAL EVERY 8 HOURS PRN
Qty: 4 TABLET | Refills: 0 | Status: SHIPPED | OUTPATIENT
Start: 2019-11-27 | End: 2020-01-01

## 2019-11-27 RX ORDER — ONDANSETRON 2 MG/ML
4 INJECTION INTRAMUSCULAR; INTRAVENOUS EVERY 30 MIN PRN
Status: DISCONTINUED | OUTPATIENT
Start: 2019-11-27 | End: 2019-11-27 | Stop reason: HOSPADM

## 2019-11-27 RX ORDER — ONDANSETRON 4 MG/1
4 TABLET, ORALLY DISINTEGRATING ORAL EVERY 30 MIN PRN
Status: DISCONTINUED | OUTPATIENT
Start: 2019-11-27 | End: 2019-11-27 | Stop reason: HOSPADM

## 2019-11-27 RX ORDER — HEPARIN SODIUM 5000 [USP'U]/.5ML
5000 INJECTION, SOLUTION INTRAVENOUS; SUBCUTANEOUS
Status: COMPLETED | OUTPATIENT
Start: 2019-11-27 | End: 2019-11-27

## 2019-11-27 RX ORDER — ACETAMINOPHEN 325 MG/1
650 TABLET ORAL EVERY 6 HOURS
Qty: 24 TABLET | Refills: 0 | Status: SHIPPED | OUTPATIENT
Start: 2019-11-27

## 2019-11-27 RX ORDER — PROPOFOL 10 MG/ML
INJECTION, EMULSION INTRAVENOUS CONTINUOUS PRN
Status: DISCONTINUED | OUTPATIENT
Start: 2019-11-27 | End: 2019-11-27

## 2019-11-27 RX ORDER — MEPERIDINE HYDROCHLORIDE 25 MG/ML
12.5 INJECTION INTRAMUSCULAR; INTRAVENOUS; SUBCUTANEOUS
Status: DISCONTINUED | OUTPATIENT
Start: 2019-11-27 | End: 2019-11-27 | Stop reason: HOSPADM

## 2019-11-27 RX ORDER — ONDANSETRON 2 MG/ML
INJECTION INTRAMUSCULAR; INTRAVENOUS PRN
Status: DISCONTINUED | OUTPATIENT
Start: 2019-11-27 | End: 2019-11-27

## 2019-11-27 RX ORDER — SCOLOPAMINE TRANSDERMAL SYSTEM 1 MG/1
1 PATCH, EXTENDED RELEASE TRANSDERMAL
Status: DISCONTINUED | OUTPATIENT
Start: 2019-11-27 | End: 2019-11-27 | Stop reason: HOSPADM

## 2019-11-27 RX ORDER — FENTANYL CITRATE 50 UG/ML
25-50 INJECTION, SOLUTION INTRAMUSCULAR; INTRAVENOUS EVERY 5 MIN PRN
Status: DISCONTINUED | OUTPATIENT
Start: 2019-11-27 | End: 2019-11-27 | Stop reason: HOSPADM

## 2019-11-27 RX ORDER — ACETAMINOPHEN 325 MG/1
975 TABLET ORAL ONCE
Status: COMPLETED | OUTPATIENT
Start: 2019-11-27 | End: 2019-11-27

## 2019-11-27 RX ORDER — PHENAZOPYRIDINE HYDROCHLORIDE 200 MG/1
200 TABLET, FILM COATED ORAL ONCE
Status: COMPLETED | OUTPATIENT
Start: 2019-11-27 | End: 2019-11-27

## 2019-11-27 RX ADMIN — DEXAMETHASONE SODIUM PHOSPHATE 4 MG: 4 INJECTION, SOLUTION INTRA-ARTICULAR; INTRALESIONAL; INTRAMUSCULAR; INTRAVENOUS; SOFT TISSUE at 13:38

## 2019-11-27 RX ADMIN — FENTANYL CITRATE 100 MCG: 50 INJECTION, SOLUTION INTRAMUSCULAR; INTRAVENOUS at 13:26

## 2019-11-27 RX ADMIN — PROPOFOL 160 MG: 10 INJECTION, EMULSION INTRAVENOUS at 13:30

## 2019-11-27 RX ADMIN — FENTANYL CITRATE 50 MCG: 50 INJECTION, SOLUTION INTRAMUSCULAR; INTRAVENOUS at 15:44

## 2019-11-27 RX ADMIN — ROCURONIUM BROMIDE 50 MG: 10 INJECTION INTRAVENOUS at 13:30

## 2019-11-27 RX ADMIN — MIDAZOLAM 1 MG: 1 INJECTION INTRAMUSCULAR; INTRAVENOUS at 13:21

## 2019-11-27 RX ADMIN — FENTANYL CITRATE 25 MCG: 50 INJECTION, SOLUTION INTRAMUSCULAR; INTRAVENOUS at 15:10

## 2019-11-27 RX ADMIN — MIDAZOLAM 1 MG: 1 INJECTION INTRAMUSCULAR; INTRAVENOUS at 13:07

## 2019-11-27 RX ADMIN — ONDANSETRON 4 MG: 2 INJECTION INTRAMUSCULAR; INTRAVENOUS at 13:07

## 2019-11-27 RX ADMIN — SCOPALAMINE 1 PATCH: 1 PATCH, EXTENDED RELEASE TRANSDERMAL at 13:07

## 2019-11-27 RX ADMIN — FENTANYL CITRATE 50 MCG: 50 INJECTION, SOLUTION INTRAMUSCULAR; INTRAVENOUS at 15:35

## 2019-11-27 RX ADMIN — ONDANSETRON 4 MG: 2 INJECTION INTRAMUSCULAR; INTRAVENOUS at 14:20

## 2019-11-27 RX ADMIN — PHENAZOPYRIDINE HYDROCHLORIDE 200 MG: 200 TABLET ORAL at 12:20

## 2019-11-27 RX ADMIN — FENTANYL CITRATE 50 MCG: 50 INJECTION, SOLUTION INTRAMUSCULAR; INTRAVENOUS at 15:59

## 2019-11-27 RX ADMIN — Medication: at 16:43

## 2019-11-27 RX ADMIN — FENTANYL CITRATE 50 MCG: 50 INJECTION, SOLUTION INTRAMUSCULAR; INTRAVENOUS at 14:38

## 2019-11-27 RX ADMIN — PROPOFOL 25 MCG/KG/MIN: 10 INJECTION, EMULSION INTRAVENOUS at 13:35

## 2019-11-27 RX ADMIN — FENTANYL CITRATE 25 MCG: 50 INJECTION, SOLUTION INTRAMUSCULAR; INTRAVENOUS at 15:00

## 2019-11-27 RX ADMIN — MIDAZOLAM 2 MG: 1 INJECTION INTRAMUSCULAR; INTRAVENOUS at 13:26

## 2019-11-27 RX ADMIN — SUGAMMADEX 200 MG: 100 INJECTION, SOLUTION INTRAVENOUS at 14:38

## 2019-11-27 RX ADMIN — SODIUM CHLORIDE, POTASSIUM CHLORIDE, SODIUM LACTATE AND CALCIUM CHLORIDE: 600; 310; 30; 20 INJECTION, SOLUTION INTRAVENOUS at 13:21

## 2019-11-27 RX ADMIN — FENTANYL CITRATE 50 MCG: 50 INJECTION, SOLUTION INTRAMUSCULAR; INTRAVENOUS at 15:18

## 2019-11-27 RX ADMIN — ACETAMINOPHEN 975 MG: 325 TABLET, FILM COATED ORAL at 15:48

## 2019-11-27 RX ADMIN — LIDOCAINE HYDROCHLORIDE 100 MG: 20 INJECTION, SOLUTION INFILTRATION; PERINEURAL at 13:30

## 2019-11-27 RX ADMIN — FENTANYL CITRATE 100 MCG: 50 INJECTION, SOLUTION INTRAMUSCULAR; INTRAVENOUS at 13:53

## 2019-11-27 RX ADMIN — HEPARIN SODIUM 5000 UNITS: 5000 INJECTION, SOLUTION INTRAVENOUS; SUBCUTANEOUS at 12:24

## 2019-11-27 ASSESSMENT — MIFFLIN-ST. JEOR: SCORE: 1577.25

## 2019-11-27 NOTE — PROGRESS NOTES
Gynecologic Oncology Postoperative Check Note  11/27/2019    S: Patient is feeling well. No dizziness, chest pain or SOB. Tolerating regular diet. Pain is manageable. Ambulating appropriately. Voiding without issue.    O:  Vitals:    11/27/19 1615 11/27/19 1622 11/27/19 1650 11/27/19 1720   BP:  (!) 129/90 (!) 148/85 (!) 169/98   BP Location:       Pulse:    60   Resp:  16 16 16   Temp:  96.9  F (36.1  C)     TempSrc:  Axillary     SpO2: 93% 94% 99% 100%   Weight:       Height:           Gen: No acute distress  Cardio: RRR, No murmur  Resp: clear to ascultation, no increased work of breathing  Abdomen: soft, appropriately tender, non distended   Incision: clean dry and intact, covered with dermabond  Extremities: trace LE edema    A: 39 year old POD#0 s/p diagnostic laparoscopy, left oophrectomy, lysis of adhesions. Doing appropriately after surgery. She is meeting goals and ready for discharge to home.     Rita Turner MD PhD  OB/GYN PGY-4  11/27/2019 5:45 PM

## 2019-11-27 NOTE — OP NOTE
Gynecologic Oncology Operative Note    DATE OF SURGERY: 11/27/19    PREOPERATIVE DIAGNOSES: pelvic mass, elevated      POSTOPERATIVE DIAGNOSES: left ovarian endometrioma on frozen section, final pathology pending    OPERATIVE PROCEDURES: diagnostic laparoscopy, left oophrectomy, lysis of adhesions    SURGEON: Chanel Pereyra MD   ASSISTANTS: Eliceo Rider MD    ANESTHESIA: General endotracheal anesthesia     ESTIMATED BLOOD LOSS: 50 mL  TOTAL INTRAVENOUS FLUIDS: 800 mL crystalloid   TOTAL URINE OUTPUT: 325 mL clear urine in Hardy; removed at end of case    INDICATION:  38 yo previously s/p LAVH/BS presented with abdominal pain, found to have CT findings of ~7 cm left adnexal mass with elevated .  Following a thorough discussion of the risks, benefits, indications and alternatives she consented to the above procedure.    SPECIMENS:  ID Type Source Tests Collected by Time Destination   1 : pelvic washings  Fluid Other CYTOLOGY NON GYN Chanel Mata MD 11/27/2019  2:02 PM    A : left ovary  Tissue Other SURGICAL PATHOLOGY EXAM Chanel Mata MD 11/27/2019  2:09 PM      FINDINGS:   Smooth vaginal cuff without obvious lesions, no obvious left adnexal mass palpable on bimanual exam.  On entry, normal upper abdomen with smooth diaphragm and liver surfaces.  Evidence of chocolate lesions throughout the pelvis on on the omental surfaces consistent with endometriosis. Left ovary enlarged with a chocolate cyst which had ruptured and it was adherent to the vaginal cuff and left pelvic sidewall.  Uterus and fallopian tubes surgically absent.  Right ovary was grossly normal in appearance.  Frozen section consistent with endometriosis      COMPLICATIONS: None apparent.     CONDITION: Stable to PACU.     OPERATIVE PROCEDURE IN DETAIL:  After informed consent was verified, the patient was brought to the operating room where she underwent general anesthesia.  She was then placed in dorsal lithotomy  position with Yellowfin stirrups, then prepped and draped in the usual sterile fashion.  A Hardy catheter was placed and a safety time out was performed.    The umbilicus was everted, a Veress needle was used to insufflate the abdomen.  The Veress needle was removed and a 5 mm trocar was placed.  The abdomen was surveyed without evidence of injury with the additional findings noted above.  Attention was turned to the left lower quadrant where a 12 mm incision was made, and a 12 mm trocar was placed under direct visualization.  Attention was then turned to the right lower quadrant. Similarly, a 5 mm incision was made, and a 5 mm trocar was placed under direct visualization.  The patient was placed in steep Trendelenburg position, and pelvic washings were obtained.    Some inflammatory adhesions of the small bowel and sigmoid mesentery to the anterior abdominal wall and left ovary were gently lysed, revealing the left adnexal mass and a grossly normal right ovary.  The right ureter was identified transperitoneally along the right pelvic sidewall.  During the dissection to separate the mass from the left sidewall, unavoidable rupture of the ovarian cyst occurred, with chocolate colored fluid.  The left sidewall peritoneum was then incised below the remnant of the left round ligament, parallel to the left IP ligament.  The retroperitoneum was then entered and the ureter was identified along the medial leaf of the broad ligament.  The Ligasure was then used to seal and divide the IP ligament above and away from the ureter, and the specimen was amputated from the sidewall.  The specimen was removed in an Endocatch bag; frozen section revealed that it was a benign endometrioma.    The pelvis was irrigated.  Good hemostasis was noted, and a piece of Surgicel was placed along the left sidewall and vaginal cuff as there were raw surfaces where the ovary had been adherent. The 12 mm fascial incision was closed with an 0 Vicryl  suture using the Donnie-Oscar device.  All skin incisions were then closed using surgical glue.  All sponge, needle, and instrument counts were correct. The patient tolerated the procedure well and was transferred to recovery in stable condition.    Dr. Pereyra was present and scrubbed for the entire procedure.    MD Chanel Pickett MD  Gynecologic Oncology  [facility]

## 2019-11-27 NOTE — ANESTHESIA POSTPROCEDURE EVALUATION
Anesthesia POST Procedure Evaluation    Patient: Jessika Evans   MRN:     [id number] Gender:   female   Age:    39 year old :      1980        Preoperative Diagnosis: Pelvic mass [R19.00]   Procedure(s):  laparoscopic left oophrectomy, lysis of adhesion   Postop Comments: No value filed.       Anesthesia Type:  Not documented  General    Reportable Event: NO     PAIN: Uncomplicated   Sign Out status: Comfortable, Well controlled pain     PONV: No PONV   Sign Out status:  No Nausea or Vomiting     Neuro/Psych: Uneventful perioperative course   Sign Out Status: Preoperative baseline; Age appropriate mentation     Airway/Resp.: Uneventful perioperative course   Sign Out Status: Non labored breathing, age appropriate RR; Resp. Status within EXPECTED Parameters     CV: Uneventful perioperative course   Sign Out status: Appropriate BP and perfusion indices; Appropriate HR/Rhythm     Disposition:   Sign Out in:  PACU  Disposition:  Phase II; Home  Recovery Course: Uneventful  Follow-Up: Not required           Last Anesthesia Record Vitals:  CRNA VITALS  2019 1415 - 2019 1515      2019             NIBP:  114/80    Ht Rate:  73    Resp Rate (observed):  12    EKG:  Sinus rhythm          Last PACU Vitals:  Vitals Value Taken Time   /92 2019  3:30 PM   Temp 36.8  C (98.2  F) 2019  3:15 PM   Pulse 60 2019  3:30 PM   Resp 18 2019  3:30 PM   SpO2 97 % 2019  3:40 PM   Temp src     NIBP     Pulse     SpO2     Resp     Temp     Ht Rate     Temp 2     Vitals shown include unvalidated device data.      Electronically Signed By: Sarah Browne MD, 2019, 3:41 PM

## 2019-11-27 NOTE — DISCHARGE INSTRUCTIONS
[facility]  Same-Day Surgery   Adult Discharge Orders & Instructions     For 24 hours after surgery    1. Get plenty of rest.  A responsible adult must stay with you for at least 24 hours after you leave the hospital.   2. Do not drive or use heavy equipment.  If you have weakness or tingling, don't drive or use heavy equipment until this feeling goes away.  3. Do not drink alcohol.  4. Avoid strenuous or risky activities.  Ask for help when climbing stairs.   5. You may feel lightheaded.  IF so, sit for a few minutes before standing.  Have someone help you get up.   6. If you have nausea (feel sick to your stomach): Drink only clear liquids such as apple juice, ginger ale, broth or 7-Up.  Rest may also help.  Be sure to drink enough fluids.  Move to a regular diet as you feel able.  7. You may have a slight fever. Call the doctor if your fever is over 100 F (37.7 C) (taken under the tongue) or lasts longer than 24 hours.  8. You may have a dry mouth, a sore throat, muscle aches or trouble sleeping.  These should go away after 24 hours.  9. Do not make important or legal decisions.   Call your doctor for any of the followin.  Signs of infection (fever, growing tenderness at the surgery site, a large amount of drainage or bleeding, severe pain, foul-smelling drainage, redness, swelling).    2. It has been over 8 to 10 hours since surgery and you are still not able to urinate (pass water).    3.  Headache for over 24 hours.    4.  Numbness, tingling or weakness the day after surgery (if you had spinal anesthesia).  To contact a doctor, call Dr. Roddy sullivan at [phone] or:        [phone] and ask for the resident on call for   Gyn/Onc (answered 24 hours a day)      Emergency Department:    [facility]: [phone]       (TTY for hearing impaired: [phone])    [facility]: [phone]       (TTY for hearing impaired: [phone])

## 2019-11-27 NOTE — ANESTHESIA CARE TRANSFER NOTE
Patient: Jessika Evans    Procedure(s):  laparoscopic left oophrectomy, lysis of adhesion    Diagnosis: Pelvic mass [R19.00]  Diagnosis Additional Information: No value filed.    Anesthesia Type:   General     Note:  Airway :Nasal Cannula  Patient transferred to:PACU  Comments: Pt to recovery room.  Spontaneous respirations.   Pt c/o nausea.  Report given to RN.  Handoff Report: Identifed the Patient, Identified the Reponsible Provider, Reviewed the pertinent medical history, Discussed the surgical course, Reviewed Intra-OP anesthesia mangement and issues during anesthesia, Set expectations for post-procedure period and Allowed opportunity for questions and acknowledgement of understanding      Vitals: (Last set prior to Anesthesia Care Transfer)    CRNA VITALS  11/27/2019 1415 - 11/27/2019 1450      11/27/2019             Resp Rate (observed):  12    EKG:  Sinus rhythm                Electronically Signed By: CHANTAL Roth CRNA  November 27, 2019  2:50 PM

## 2019-11-27 NOTE — ANESTHESIA PREPROCEDURE EVALUATION
Anesthesia Pre-Procedure Evaluation    Patient: Jessika Evans   MRN:     [id number] Gender:   female   Age:    39 year old :      1980        Preoperative Diagnosis: Pelvic mass [R19.00]   Procedure(s):  Laparoscopic removal of one ovary, possible cancer surgery  SALPINGO-OOPHORECTOMY, WITH LYMPHADENECTOMY     Past Medical History:   Diagnosis Date     PONV (postoperative nausea and vomiting)       Past Surgical History:   Procedure Laterality Date     C VAGINAL HYSTERECTOMY       CARPAL TUNNEL RELEASE RT/LT Right      LAPAROSCOPIC CHOLECYSTECTOMY            Anesthesia Evaluation     . Pt has had prior anesthetic.     History of anesthetic complications   - PONV        ROS/MED HX    ENT/Pulmonary:  - neg pulmonary ROS     Neurologic:  - neg neurologic ROS     Cardiovascular:  - neg cardiovascular ROS       METS/Exercise Tolerance:  >4 METS   Hematologic:  - neg hematologic  ROS       Musculoskeletal:  - neg musculoskeletal ROS       GI/Hepatic:  - neg GI/hepatic ROS       Renal/Genitourinary:  - ROS Renal section negative       Endo:     (+) Obesity (BMI=38), .      Psychiatric:         Infectious Disease:  - neg infectious disease ROS       Malignancy:   (+)   19:  CT A/P:  Multiloculated cystic mass in the left adnexa, likely arising from the left ovary measuring 8.7 cm.  Mild inflammatory changes around this mass.  No areas of metastatic disease identified.     19:  US Pelvis:  Complex cystic mass in the left ovary measuring 9.3 cm with a thickened wall and thick septations     19:  OVA1 = 9.6 = high risk                  = 338        Other:    (+) No chance of pregnancy                        PHYSICAL EXAM:   Mental Status/Neuro:    Airway: Facies: Feasible  Mallampati: I  Mouth/Opening: Full  TM distance: > 6 cm  Neck ROM: Full   Respiratory: Auscultation: CTAB     Resp. Rate: Normal     Resp. Effort: Normal      CV: Rhythm: Regular  Rate: Age appropriate  Heart: Normal  "Sounds  Edema: None   Comments:                      LABS:  CBC:   Lab Results   Component Value Date    WBC 8.1 11/19/2019    HGB 13.7 11/19/2019    HCT 41.8 11/19/2019     11/19/2019     BMP:   Lab Results   Component Value Date     11/19/2019    POTASSIUM 3.9 11/19/2019    CHLORIDE 110 (H) 11/19/2019    CO2 24 11/19/2019    BUN 6 (L) 11/19/2019    CR 0.72 11/19/2019     (H) 11/19/2019     COAGS: No results found for: PTT, INR, FIBR  POC:   Lab Results   Component Value Date    BGM 83 11/27/2019     OTHER:   Lab Results   Component Value Date    DANA 9.1 11/19/2019    ALBUMIN 3.8 11/19/2019    PROTTOTAL 7.4 11/19/2019    ALT 19 11/19/2019    AST 20 11/19/2019    ALKPHOS 64 11/19/2019    BILITOTAL 0.4 11/19/2019        Preop Vitals    BP Readings from Last 3 Encounters:   11/27/19 (!) 134/98   11/19/19 (!) 159/100    Pulse Readings from Last 3 Encounters:   11/27/19 80   11/19/19 118      Resp Readings from Last 3 Encounters:   11/27/19 14   11/19/19 16    SpO2 Readings from Last 3 Encounters:   11/27/19 100%   11/19/19 97%      Temp Readings from Last 1 Encounters:   11/27/19 36.5  C (97.7  F) (Oral)    Ht Readings from Last 1 Encounters:   11/27/19 1.575 m (5' 2\")      Wt Readings from Last 1 Encounters:   11/27/19 94.4 kg (208 lb 1.8 oz)    Estimated body mass index is 38.06 kg/m  as calculated from the following:    Height as of this encounter: 1.575 m (5' 2\").    Weight as of this encounter: 94.4 kg (208 lb 1.8 oz).     LDA:        Assessment:   ASA SCORE: 2    H&P: History and physical reviewed and following examination; no interval change.    NPO Status: NPO Appropriate     Plan:   Anes. Type:  General   Pre-Medication: Midazolam   Induction:  IV (Standard)   Airway: ETT   Access/Monitoring: PIV   Maintenance: Balanced     Postop Plan:   Postop Pain: None  Disposition: Outpatient     PONV Management:   Adult Risk Factors: Female, H/o PONV or Motion Sickness   Prevention: Ondansetron, " Dexamethasone     CONSENT: Direct conversation   Plan and risks discussed with: Patient                      Sofie Nolasco MD

## 2019-11-29 ENCOUNTER — PATIENT OUTREACH (OUTPATIENT)
Dept: ONCOLOGY | Facility: CLINIC | Age: 39
End: 2019-11-29

## 2019-11-29 NOTE — PROGRESS NOTES
Pt s/p Diagnostic Laparoscopy, left oophorectomy and lysis of adhesions on 11/27/19 with Dr Pereyra.  Pt called to assess post op and pt states she is doing well.  Eating and drinking well, no s/s infection noted at abdominal port sites, denies fevers, and had normal BM this morning.  Pt states she is unable to keep her post op appt on 12/10 and would like to change the date.  Pt informed that MD is not back in the clinic until 12/31 and appt rescheduled for that day at 10:30 am.  Pt informed that if she has any new concerns or questions, we also have NP in the clinic and pt can be sooner.  Pt verbalized understanding of plan.  Dr Pereyra notified.    Marylin Velasco, RN, BSN, OCN

## 2019-12-02 LAB — COPATH REPORT: NORMAL

## 2019-12-03 LAB — COPATH REPORT: NORMAL

## 2019-12-31 ENCOUNTER — ONCOLOGY VISIT (OUTPATIENT)
Dept: ONCOLOGY | Facility: CLINIC | Age: 39
End: 2019-12-31
Attending: OBSTETRICS & GYNECOLOGY
Payer: COMMERCIAL

## 2019-12-31 VITALS
DIASTOLIC BLOOD PRESSURE: 85 MMHG | OXYGEN SATURATION: 94 % | WEIGHT: 212 LBS | HEART RATE: 85 BPM | RESPIRATION RATE: 16 BRPM | BODY MASS INDEX: 38.78 KG/M2 | SYSTOLIC BLOOD PRESSURE: 134 MMHG | TEMPERATURE: 98.9 F

## 2019-12-31 DIAGNOSIS — N80.9 ENDOMETRIOSIS: Primary | ICD-10-CM

## 2019-12-31 PROCEDURE — G0463 HOSPITAL OUTPT CLINIC VISIT: HCPCS

## 2019-12-31 PROCEDURE — 99213 OFFICE O/P EST LOW 20 MIN: CPT | Mod: 24 | Performed by: OBSTETRICS & GYNECOLOGY

## 2019-12-31 ASSESSMENT — PAIN SCALES - GENERAL: PAINLEVEL: NO PAIN (0)

## 2019-12-31 NOTE — LETTER
2019         RE: Jessika Evans  [address] [place] [address]        Dear Colleague,    Thank you for referring your patient, Jessika Evans, to the [facility]. Please see a copy of my visit note below.    Consult Notes on Referred Patient              RE: Jessika Evans  : 1980  AMRTIN: Dec 31, 2019      HPI: Jessika Evans is 39 year old with endometriosis.  She is accompanied today by her mother and good friend.  She is doing well post-operatively.  Eating and drinking normally.  Normal bowel and bladder function.  Minimal pain.    Clinical Course:    Patient developed fever and RLQ pain associated with diarrhea and presented to the ED.  She underwent a CT as there was concern for appendicitis.  Her appendix was noted to be normal but she was found to have a left adnexal mass.  Her fever and diarrhea have since resolved and her son had a similar gastroenteritis like illness a few days following this episode.  She is now asymptomatic.  Of note she had a hysterectomy, BS and was also found to have endometriosis that was fulgurated at the time of that procedure.    19:  CT A/P:  Multiloculated cystic mass in the left adnexa, likely arising from the left ovary measuring 8.7 cm.  Mild inflammatory changes around this mass.  No areas of metastatic disease identified.    19:  US Pelvis:  Complex cystic mass in the left ovary measuring 9.3 cm with a thickened wall and thick septations    19:  OVA1 = 9.6 = high risk    = 338    19:  Diagnostic laparoscopy, left oophrectomy, lysis of adhesions   Pathology:  Endometriotic cyst      Review of Systems:  Systemic           no weight changes; no fever; no chills; no night sweats; no appetite changes  Skin           no rashes, or lesions  Eye           no irritation; no changes in vision  Jean-Laryngeal           no dysphagia; no hoarseness   Pulmonary    no cough; no shortness of breath  Cardiovascular    no chest  pain; no palpitations  Gastrointestinal    no diarrhea; no constipation; no abdominal pain; no changes in bowel  habits; no blood in stool  Genitourinary   no urinary frequency; no urinary urgency; no dysuria; no pain; no abnormal vaginal discharge; no abnormal vaginal bleeding  Breast    no breast discharge; no breast changes; no breast pain  Musculoskeletal    no myalgias; no arthralgias; no back pain  Psychiatric           no depressed mood; no anxiety    Hematologic            no tender lymph nodes; no noticeable swellings or lumps   Endocrine    no hot flashes; no heat/cold intolerance         Neurological   no tremor; no numbness and tingling; no headaches; no difficulty sleeping    Obstetrics and Gynecology History:  ,  x 1      Past Medical History:  Past Medical History:   Diagnosis Date     PONV (postoperative nausea and vomiting)        Past Surgical History:  Past Surgical History:   Procedure Laterality Date     C VAGINAL HYSTERECTOMY       CARPAL TUNNEL RELEASE RT/LT Right      LAPAROSCOPIC CHOLECYSTECTOMY       LAPAROSCOPIC SALPINGO-OOPHORECTOMY, DISSECT LYMPH NODE N/A 2019    Procedure: laparoscopic left oophrectomy, lysis of adhesion;  Surgeon: Chanel Mata MD;  Location:  OR       Health Maintenance:  Last Pap Smear: Prior to hysterectomy-no need for further pap smear exams  She has not had a history of abnormal Pap smears.    Last Mammogram: N/A    Last Colonoscopy: N/A      Current Medications:   has a current medication list which includes the following prescription(s): acetaminophen, ibuprofen, and lorazepam.     Allergies:   Patient has no known allergies.      Social History:  Social History     Socioeconomic History     Marital status:      Spouse name: Not on file     Number of children: Not on file     Years of education: Not on file     Highest education level: Not on file   Occupational History     Not on file   Social Needs     Financial resource  strain: Not on file     Food insecurity:     Worry: Not on file     Inability: Not on file     Transportation needs:     Medical: Not on file     Non-medical: Not on file   Tobacco Use     Smoking status: Current Some Day Smoker     Types: Cigarettes     Smokeless tobacco: Never Used     Tobacco comment: very rare    Substance and Sexual Activity     Alcohol use: Yes     Comment: occas      Drug use: Never     Sexual activity: Yes   Lifestyle     Physical activity:     Days per week: Not on file     Minutes per session: Not on file     Stress: Not on file   Relationships     Social connections:     Talks on phone: Not on file     Gets together: Not on file     Attends Jainism service: Not on file     Active member of club or organization: Not on file     Attends meetings of clubs or organizations: Not on file     Relationship status: Not on file     Intimate partner violence:     Fear of current or ex partner: Not on file     Emotionally abused: Not on file     Physically abused: Not on file     Forced sexual activity: Not on file   Other Topics Concern     Not on file   Social History Narrative     Not on file       Lives with , feels safe at home.  Works as a .  Enjoys riding horses, reading, watching son play hockey.  Does not have an advanced directive on file and would like her  to be her POA.  Full code.    Family History:   The patient's family history is significant for.  Family History   Problem Relation Age of Onset     Lymphoma Father      Breast Cancer Paternal Grandmother          Physical Exam:   /85   Pulse 85   Temp 98.9  F (37.2  C) (Tympanic)   Resp 16   Wt 96.2 kg (212 lb)   SpO2 94%   BMI 38.78 kg/m     Body mass index is 38.78 kg/m .    General Appearance: healthy and alert, no distress     Gastrointestinal:       abdomen soft, non-tender, non-distended, no organomegaly or masses, port sites without erythema/induration or  drainage        Assessment:    Jessika Marks is a 39 year old woman with a diagnosis of endometriosis.     A total of 15 minutes was spent with the patient, >50% of which were spent in counseling the patient and/or treatment planning, this was separate from the 5 minutes spent on post-operative cares.      Plan:     1.)    Endometriotic cyst.  Pathology was reviewed and she was provided with a copy of her results. Given the benign pathology there is no need for further follow up in the gynecologic oncology clinic.  She should continue to have annual exams including a pelvic exam with her PCP     2.) Genetic risk factors were assessed and the patient does not meet the qualifications for a referral     3.) Labs and/or tests ordered include:  None     4.) Health maintenance issues addressed today include pt is up to date.        Thank you for allowing us to participate in the care of your patient.         Sincerely,    Chanel Pereyra MD  Gynecologic Oncology  [facility]       CC           Again, thank you for allowing me to participate in the care of your patient.        Sincerely,        Dion Pereyra MD

## 2019-12-31 NOTE — PROGRESS NOTES
Consult Notes on Referred Patient              RE: Jessika Evans  : 1980  MARTIN: Dec 31, 2019      HPI: Jessika Evans is 39 year old with endometriosis.  She is accompanied today by her mother and good friend.  She is doing well post-operatively.  Eating and drinking normally.  Normal bowel and bladder function.  Minimal pain.    Clinical Course:    Patient developed fever and RLQ pain associated with diarrhea and presented to the ED.  She underwent a CT as there was concern for appendicitis.  Her appendix was noted to be normal but she was found to have a left adnexal mass.  Her fever and diarrhea have since resolved and her son had a similar gastroenteritis like illness a few days following this episode.  She is now asymptomatic.  Of note she had a hysterectomy, BS and was also found to have endometriosis that was fulgurated at the time of that procedure.    19:  CT A/P:  Multiloculated cystic mass in the left adnexa, likely arising from the left ovary measuring 8.7 cm.  Mild inflammatory changes around this mass.  No areas of metastatic disease identified.    19:  US Pelvis:  Complex cystic mass in the left ovary measuring 9.3 cm with a thickened wall and thick septations    19:  OVA1 = 9.6 = high risk    = 338    19:  Diagnostic laparoscopy, left oophrectomy, lysis of adhesions   Pathology:  Endometriotic cyst      Review of Systems:  Systemic           no weight changes; no fever; no chills; no night sweats; no appetite changes  Skin           no rashes, or lesions  Eye           no irritation; no changes in vision  Jean-Laryngeal           no dysphagia; no hoarseness   Pulmonary    no cough; no shortness of breath  Cardiovascular    no chest pain; no palpitations  Gastrointestinal    no diarrhea; no constipation; no abdominal pain; no changes in bowel  habits; no blood in stool  Genitourinary   no urinary frequency; no urinary urgency; no dysuria; no pain; no abnormal  vaginal discharge; no abnormal vaginal bleeding  Breast    no breast discharge; no breast changes; no breast pain  Musculoskeletal    no myalgias; no arthralgias; no back pain  Psychiatric           no depressed mood; no anxiety    Hematologic            no tender lymph nodes; no noticeable swellings or lumps   Endocrine    no hot flashes; no heat/cold intolerance         Neurological   no tremor; no numbness and tingling; no headaches; no difficulty sleeping    Obstetrics and Gynecology History:  ,  x 1      Past Medical History:  Past Medical History:   Diagnosis Date     PONV (postoperative nausea and vomiting)        Past Surgical History:  Past Surgical History:   Procedure Laterality Date     C VAGINAL HYSTERECTOMY       CARPAL TUNNEL RELEASE RT/LT Right      LAPAROSCOPIC CHOLECYSTECTOMY       LAPAROSCOPIC SALPINGO-OOPHORECTOMY, DISSECT LYMPH NODE N/A 2019    Procedure: laparoscopic left oophrectomy, lysis of adhesion;  Surgeon: Chanel Mata MD;  Location:  OR       Health Maintenance:  Last Pap Smear: Prior to hysterectomy-no need for further pap smear exams  She has not had a history of abnormal Pap smears.    Last Mammogram: N/A    Last Colonoscopy: N/A      Current Medications:   has a current medication list which includes the following prescription(s): acetaminophen, ibuprofen, and lorazepam.     Allergies:   Patient has no known allergies.      Social History:  Social History     Socioeconomic History     Marital status:      Spouse name: Not on file     Number of children: Not on file     Years of education: Not on file     Highest education level: Not on file   Occupational History     Not on file   Social Needs     Financial resource strain: Not on file     Food insecurity:     Worry: Not on file     Inability: Not on file     Transportation needs:     Medical: Not on file     Non-medical: Not on file   Tobacco Use     Smoking status: Current Some Day Smoker      Types: Cigarettes     Smokeless tobacco: Never Used     Tobacco comment: very rare    Substance and Sexual Activity     Alcohol use: Yes     Comment: occas      Drug use: Never     Sexual activity: Yes   Lifestyle     Physical activity:     Days per week: Not on file     Minutes per session: Not on file     Stress: Not on file   Relationships     Social connections:     Talks on phone: Not on file     Gets together: Not on file     Attends [employer] service: Not on file     Active member of club or organization: Not on file     Attends meetings of clubs or organizations: Not on file     Relationship status: Not on file     Intimate partner violence:     Fear of current or ex partner: Not on file     Emotionally abused: Not on file     Physically abused: Not on file     Forced sexual activity: Not on file   Other Topics Concern     Not on file   Social History Narrative     Not on file       Lives with , feels safe at home.  Works as a .  Enjoys riding horses, reading, watching son play hockey.  Does not have an advanced directive on file and would like her  to be her POA.  Full code.    Family History:   The patient's family history is significant for.  Family History   Problem Relation Age of Onset     Lymphoma Father      Breast Cancer Paternal Grandmother          Physical Exam:   /85   Pulse 85   Temp 98.9  F (37.2  C) (Tympanic)   Resp 16   Wt 96.2 kg (212 lb)   SpO2 94%   BMI 38.78 kg/m    Body mass index is 38.78 kg/m .    General Appearance: healthy and alert, no distress     Gastrointestinal:       abdomen soft, non-tender, non-distended, no organomegaly or masses, port sites without erythema/induration or drainage        Assessment:    Jessika Marks is a 39 year old woman with a diagnosis of endometriosis.     A total of 15 minutes was spent with the patient, >50% of which were spent in counseling the patient and/or treatment planning, this was separate from the 5  minutes spent on post-operative cares.      Plan:     1.)    Endometriotic cyst.  Pathology was reviewed and she was provided with a copy of her results. Given the benign pathology there is no need for further follow up in the gynecologic oncology clinic.  She should continue to have annual exams including a pelvic exam with her PCP     2.) Genetic risk factors were assessed and the patient does not meet the qualifications for a referral     3.) Labs and/or tests ordered include:  None     4.) Health maintenance issues addressed today include pt is up to date.        Thank you for allowing us to participate in the care of your patient.         Sincerely,    Chanel Pereyra MD  Gynecologic Oncology  [facility]       CC

## 2019-12-31 NOTE — NURSING NOTE
"Oncology Rooming Note    December 31, 2019 11:07 AM   Jessika Evans is a 39 year old female who presents for:    Chief Complaint   Patient presents with     Oncology Clinic Visit     Pelvic mass      Initial Vitals: /85   Pulse 85   Temp 98.9  F (37.2  C) (Tympanic)   Resp 16   Wt 96.2 kg (212 lb)   SpO2 94%   BMI 38.78 kg/m   Estimated body mass index is 38.78 kg/m  as calculated from the following:    Height as of 11/27/19: 1.575 m (5' 2\").    Weight as of this encounter: 96.2 kg (212 lb). Body surface area is 2.05 meters squared.  No Pain (0) Comment: Data Unavailable   No LMP recorded. Patient has had a hysterectomy.  Allergies reviewed: Yes  Medications reviewed: Yes    Medications: Medication refills not needed today.  Pharmacy name entered into [employer]: [id number] [address]    Clinical concerns: Follow Up       Delisa Medrano CMA              "

## (undated) DEVICE — SPECIMEN TRAP MUCOUS 40ML LUKI C30200A

## (undated) DEVICE — SU VICRYL 0 TIE 54" J608H

## (undated) DEVICE — ESU GROUND PAD ADULT W/CORD E7507

## (undated) DEVICE — GLOVE PROTEXIS POWDER FREE SMT 6.5  2D72PT65X

## (undated) DEVICE — SOL NACL 0.9% INJ 1000ML BAG 2B1324X

## (undated) DEVICE — SU VICRYL 0 UR-6 27" J603H

## (undated) DEVICE — ADH SKIN CLOSURE PREMIERPRO EXOFIN 1.0ML 3470

## (undated) DEVICE — ENDO TROCAR FIRST ENTRY KII FIOS Z-THRD 12X100MM CTF73

## (undated) DEVICE — DEVICE SUTURE PASSER 14GA WECK EFX EFXSP2

## (undated) DEVICE — ENDO POUCH UNIV RETRIEVAL SYSTEM INZII 10MM CD001

## (undated) DEVICE — KIT PATIENT POSITIONING PIGAZZI LATEX FREE 40580

## (undated) DEVICE — LINEN TOWEL PACK X6 WHITE 5487

## (undated) DEVICE — SU VICRYL 4-0 PS-2 18" UND J496H

## (undated) DEVICE — SUCTION IRR STRYKERFLOW II W/TIP 250-070-520

## (undated) DEVICE — PREP CHLORAPREP 26ML TINTED ORANGE  260815

## (undated) DEVICE — ENDO TROCAR FIRST ENTRY KII FIOS Z-THRD 05X100MM CTF03

## (undated) DEVICE — SOL WATER IRRIG 1000ML BOTTLE 2F7114

## (undated) DEVICE — ESU ENDO SCISSORS 5MM CVD 5DCS

## (undated) DEVICE — SURGICEL HEMOSTAT 4X8" 1952

## (undated) DEVICE — GLOVE PROTEXIS BLUE W/NEU-THERA 7.0  2D73EB70

## (undated) DEVICE — ESU LIGASURE LAPAROSCOPIC BLUNT TIP SEALER 5MMX37CM LF1837

## (undated) DEVICE — SOL NACL 0.9% IRRIG 1000ML BOTTLE 2F7124

## (undated) DEVICE — NDL INSUFFLATION 13GA 120MM C2201

## (undated) DEVICE — PREP TECHNI-CARE CHLOROXYLENOL 3% 4OZ BOTTLE C222-4ZWO

## (undated) DEVICE — Device

## (undated) DEVICE — LINEN TOWEL PACK X30 5481

## (undated) RX ORDER — FENTANYL CITRATE 50 UG/ML
INJECTION, SOLUTION INTRAMUSCULAR; INTRAVENOUS
Status: DISPENSED
Start: 2019-11-27

## (undated) RX ORDER — PROPOFOL 10 MG/ML
INJECTION, EMULSION INTRAVENOUS
Status: DISPENSED
Start: 2019-11-27

## (undated) RX ORDER — ONDANSETRON 2 MG/ML
INJECTION INTRAMUSCULAR; INTRAVENOUS
Status: DISPENSED
Start: 2019-11-27

## (undated) RX ORDER — DEXAMETHASONE SODIUM PHOSPHATE 4 MG/ML
INJECTION, SOLUTION INTRA-ARTICULAR; INTRALESIONAL; INTRAMUSCULAR; INTRAVENOUS; SOFT TISSUE
Status: DISPENSED
Start: 2019-11-27

## (undated) RX ORDER — GLYCOPYRROLATE 0.2 MG/ML
INJECTION, SOLUTION INTRAMUSCULAR; INTRAVENOUS
Status: DISPENSED
Start: 2019-11-27

## (undated) RX ORDER — EPHEDRINE SULFATE 50 MG/ML
INJECTION, SOLUTION INTRAMUSCULAR; INTRAVENOUS; SUBCUTANEOUS
Status: DISPENSED
Start: 2019-11-27

## (undated) RX ORDER — LIDOCAINE HYDROCHLORIDE 20 MG/ML
INJECTION, SOLUTION EPIDURAL; INFILTRATION; INTRACAUDAL; PERINEURAL
Status: DISPENSED
Start: 2019-11-27

## (undated) RX ORDER — PHENAZOPYRIDINE HYDROCHLORIDE 200 MG/1
TABLET, FILM COATED ORAL
Status: DISPENSED
Start: 2019-11-27

## (undated) RX ORDER — HEPARIN SODIUM 5000 [USP'U]/.5ML
INJECTION, SOLUTION INTRAVENOUS; SUBCUTANEOUS
Status: DISPENSED
Start: 2019-11-27

## (undated) RX ORDER — SCOLOPAMINE TRANSDERMAL SYSTEM 1 MG/1
PATCH, EXTENDED RELEASE TRANSDERMAL
Status: DISPENSED
Start: 2019-11-27

## (undated) RX ORDER — ACETAMINOPHEN 325 MG/1
TABLET ORAL
Status: DISPENSED
Start: 2019-11-27

## (undated) RX ORDER — BUPIVACAINE HYDROCHLORIDE AND EPINEPHRINE 2.5; 5 MG/ML; UG/ML
INJECTION, SOLUTION EPIDURAL; INFILTRATION; INTRACAUDAL; PERINEURAL
Status: DISPENSED
Start: 2019-11-27

## (undated) RX ORDER — IBUPROFEN 600 MG/1
TABLET, FILM COATED ORAL
Status: DISPENSED
Start: 2019-11-27

## (undated) RX ORDER — PHENYLEPHRINE HCL IN 0.9% NACL 1 MG/10 ML
SYRINGE (ML) INTRAVENOUS
Status: DISPENSED
Start: 2019-11-27

## (undated) RX ORDER — SODIUM CHLORIDE, SODIUM LACTATE, POTASSIUM CHLORIDE, CALCIUM CHLORIDE 600; 310; 30; 20 MG/100ML; MG/100ML; MG/100ML; MG/100ML
INJECTION, SOLUTION INTRAVENOUS
Status: DISPENSED
Start: 2019-11-27